# Patient Record
Sex: FEMALE | Race: WHITE
[De-identification: names, ages, dates, MRNs, and addresses within clinical notes are randomized per-mention and may not be internally consistent; named-entity substitution may affect disease eponyms.]

---

## 2021-11-22 ENCOUNTER — HOSPITAL ENCOUNTER (OUTPATIENT)
Dept: HOSPITAL 83 - COVID19 | Age: 71
Discharge: HOME | End: 2021-11-22
Attending: STUDENT IN AN ORGANIZED HEALTH CARE EDUCATION/TRAINING PROGRAM
Payer: COMMERCIAL

## 2021-11-22 DIAGNOSIS — Z11.52: Primary | ICD-10-CM

## 2023-01-05 DIAGNOSIS — M54.12 CERVICAL RADICULOPATHY: Primary | ICD-10-CM

## 2023-01-06 ENCOUNTER — PROCEDURE VISIT (OUTPATIENT)
Dept: PHYSICAL MEDICINE AND REHAB | Age: 73
End: 2023-01-06

## 2023-01-06 VITALS — BODY MASS INDEX: 22.2 KG/M2 | HEIGHT: 64 IN | WEIGHT: 130 LBS

## 2023-01-06 DIAGNOSIS — G56.03 BILATERAL CARPAL TUNNEL SYNDROME: Primary | ICD-10-CM

## 2023-01-06 DIAGNOSIS — M54.12 CERVICAL RADICULOPATHY: ICD-10-CM

## 2023-01-06 NOTE — PROGRESS NOTES
[address] [facility]  Electrodiagnostic Laboratory  *Accredited by the [address] [place] [address] with exemplary status  [address] [facility] [address]  Phone: [phone]  Fax: [phone]      Date of Examination: 01/06/23  Patient Name: Sj Longoria  is a 71y.o. year old female who was seen today regarding   Chief Complaint   Patient presents with    Extremity Pain     Right arm pain and hand pain. Pain described as throbbing and burning. Mostly at night. Pain rated 9/10. Symptoms for 4 weeks. Numbness     Right arm numbness. Extremity Weakness     Slight right hand weakness. .  The symptoms started after no injury. The symptoms are intermittent. Previous workup has included: none. No past medical history on file. Past surgical history consistent with C3-4 posterior fusion. There is not family history of neuromuscular conditions. ROS: There has been no associated vision change, hearing change, speech abnormality, swallowing abnormality, or bowel or bladder dysfunction. Physical Exam: General: The patient is in no apparent distress. Height 5' 4\" (1.626 m), weight 130 lb (59 kg). MSK: There is no joint effusion, deformity, instability, swelling, erythema or warmth. AROM is full in the spine and extremities. +Tinel bilateral wrist. Negative Spurling. Neurologic:  No focal sensorimotor deficit. Reflexes 2+ and symmetric. Gait is normal.    Impression:     1. Bilateral carpal tunnel syndrome        Plan:   EMG is indicated to evaluate the above diagnosis. Orders Placed This Encounter   Procedures    ME NEEDLE EMG EA EXTREMTY W/PARASPINL AREA COMPLETE    ME NERVE CONDUCTION STUDIES 7-8 STUDIES     EMG was done today and showed bilateral median mononeuropathy at the wrists, clinically consistent with carpal tunnel syndrome. The patient was educated about the diagnosis and the prognosis.    Recommend neutral wrist splints at h.s., OT and/or carpal tunnel injection and if no improvement after 4-6 weeks of conservative treatments consider orthopedic surgery evaluation. Recommend repeating the EMG in 1 year if symptoms persist.    Advised patient to follow up with referring provider. Thank you for allowing me to participate in the care of your patient.       Sincerely,     Eugenio Isaac, DO

## 2023-01-06 NOTE — PROGRESS NOTES
[address] [facility]  Electrodiagnostic Laboratory  *Accredited by the [address] [place] [address] with exemplary status  [address] [facility] [address]  Phone: [phone]  Fax: [phone]    Referring Provider: Gregory Stahl MD  Primary Care Physician: No primary care provider on file. Patient Name: Sammy Carrillo  Patient YOB: 1950  Gender: female  BMI: Body mass index is 22.31 kg/m². Height 5' 4\" (1.626 m), weight 130 lb (59 kg). 1/6/2023    Reason for Referral: cervical radic    Description of clinical problem:   Chief Complaint   Patient presents with    Extremity Pain     Right arm pain and hand pain. Pain described as throbbing and burning. Mostly at night. Pain rated 9/10. Symptoms for 4 weeks. Numbness     Right arm numbness. Extremity Weakness     Slight right hand weakness. Sensory NCS      Nerve / Sites Rec. Site Peak Lat PP Amp Segments Distance Velocity Temp. ms µV  cm m/s °C   R Median - Digit II (Antidromic)      Palm Dig II 2.76 15.7 Palm - Dig II 7 37 32      Wrist Dig II 5.63* 15.3 Wrist - Dig II 14 31 32   L Median - Digit II (Antidromic)      Palm Dig II 2.86 26.6 Palm - Dig II 7 35 32      Wrist Dig II 4.90* 25.8 Wrist - Dig II 14 35 32   R Ulnar - Digit V (Antidromic)      Wrist Dig V 4.00 13.4 Wrist - Dig V 14 44 32   R Radial - Anatomical snuff box (Forearm)      Forearm Wrist 2.86 20.2 Forearm - Wrist 10 45 32       Motor NCS      Nerve / Sites Muscle Onset Amplitude Segments Distance Velocity Temp.     ms mV  cm m/s °C   R Median - APB      Palm APB 2.03 11.3 Palm - APB   32      Wrist APB 6.15* 8.2 Wrist - Palm 8 19* 32      Elbow APB 9.48 8.0 Elbow - Wrist 17 51 32   L Median - APB      Palm APB 2.24 9.2 Palm - APB   32      Wrist APB 5.83* 6.1 Wrist - Palm 8 22* 32      Elbow APB 9.22 6.0 Elbow - Wrist 17 50 32   R Ulnar - ADM      Wrist ADM 3.70 12.4 Wrist - ADM 8  32      B. Elbow ADM 6.88 11.8 B. Elbow - Wrist 19 60 32      A. Elbow ADM 8.59 11. 4 A. Elbow - B. Elbow 10 58 32       F Wave      Nerve Fmin % F    ms %   R Median - APB 29.17 90   R Ulnar - ADM 29.32 20   L Median - APB 27.34 50       EMG      EMG Summary Table     Spontaneous MUAP Recruitment   Muscle Nerve Roots IA Fib PSW Fasc Amp Dur. PPP Pattern   R. Biceps brachii Musculocutaneous C5-C6 N None None None N N N N   R. Triceps brachii Radial C6-C8 N None None None N N N N   R. Pronator teres Median C6-C7 N None None None N N N N   R. First dorsal interosseous Ulnar C8-T1 N None None None N N N N   R. Abductor pollicis brevis Median E6-C4 N None None None N N N N   R. Cervical paraspinals (low)  - N None None None N N N N   R. Cervical paraspinals (mid)  - N None None None N N N N   L. Cervical paraspinals (low)  - 1+ None None None N N N N   L. Cervical paraspinals (mid)  - N None None None N N N N   L. Biceps brachii Musculocutaneous C5-C6 N None None None N N N N   L. Triceps brachii Radial C6-C8 N None None None N N N N   L. Pronator teres Median C6-C7 N None None None N N N N   L. First dorsal interosseous Ulnar C8-T1 N None None None N N N N   L. Abductor pollicis brevis Median V3-I4 N None None None N N N N        Study Limitations:  none    Summary of Findings:   Nerve conduction studies: The following nerve conduction studies were abnormal:   Bilateral median sensory latency at the wrist is prolonged. Bilateral median motor latency at the wrist is prolonged and there is focal slowing of conduction velocity across the wrists. All other nerve conduction studies, as listed in the table were normal in latency, amplitude and conduction velocity. Needle EMG:   Needle EMG was performed using a concentric needle. Observed motor units were normal in amplitude, duration, phases and recruitment and no active denervation signs were seen. Diagnostic Interpretation: This study was abnormal.     Electrodiagnosis: There is electrodiagnostic evidence of a median mononeuropathy. Location: bilateral at the wrist.   Adam Linda: [  ] Axonal   [ X ] Demyelinating  [  ] Mixed axonal and demyelinating     [  ] Sensory [  ] Motor               [ X ] Mixed sensorimotor     [  ] with active denervation       [ X ] without active denervation  Duration: Acute  Severity: moderate  Prognosis: Good    Previous Study: There is not a prior study for comparison. Follow up EMG is recommended if no surgical intervention and symptoms persist in one year. Technologist: Kinga Benavides LPN  Physician:     Kraig Russell D.O., P.T. Board Certified Physical Medicine and Rehabilitation  Board Certified Electrodiagnostic Medicine      Nerve conduction studies and electromyography were performed according to our laboratory policies and procedures which can be provided upon request. All abnormal values are identified in the table. Laboratory normal values can also be provided upon request.       Cc: Kimberley Ray MD  No primary care provider on file.

## 2023-01-06 NOTE — PATIENT INSTRUCTIONS
Electrodiagnotic Laboratory  Accredited by the [facility] with Exemplary status  DENISSE Rosa D.O. [facility]  [address] [facility] [address]  Phone: [phone]  Fax: [phone]        Today you had an electrodiagnostic exam which included nerve conduction studies (NCS) and electromyography (EMG). This test evaluated the electrical activity of your nerves and muscles to help determine if you have a nerve or muscle disease. This test can help determine the location and type of a nerve or muscle problem. This will help your referring doctor diagnose your condition and determine the appropriate next step in your treatment plan. After your test:    1. There are no long lasting side effects of the test.     2. You may resume your normal activities without restrictions. 3.  Resume any medications that were stopped for the test.     4  If you have sore areas or bruising in your muscles where the needle was placed, apply a cold pack to the sore area for 15-20 minutes three to four times a day as needed for pain. The soreness should go away in about 1-2 days. 5. Your results were provided  Briefly at the end of your test and the final detailed report will be provided to your referring physician, and/or primary care physician and any other parties you requested within 1-2 days of the examination. You may wish to contact your referring provider after a few days to determine what they would like you to do next. 6.  Please call [phone] with any questions or concerns and if you develop increased body temperature/fever, swelling, tenderness, increased pain and/or drainage from the sites where the needle was placed. Thank you for choosing us for your health care needs.

## 2023-08-02 ENCOUNTER — HOSPITAL ENCOUNTER (OUTPATIENT)
Age: 73
Setting detail: OBSERVATION
Discharge: HOME | End: 2023-08-03
Attending: STUDENT IN AN ORGANIZED HEALTH CARE EDUCATION/TRAINING PROGRAM | Admitting: STUDENT IN AN ORGANIZED HEALTH CARE EDUCATION/TRAINING PROGRAM
Payer: MEDICARE

## 2023-08-02 PROBLEM — I21.4 NSTEMI (NON-ST ELEVATED MYOCARDIAL INFARCTION) (HCC): Status: ACTIVE | Noted: 2023-08-02

## 2023-08-02 PROBLEM — Z95.5 S/P CORONARY ARTERY STENT PLACEMENT: Status: ACTIVE | Noted: 2023-08-02

## 2023-08-02 LAB
ABO + RH BLD: NORMAL
ACTIVATED CLOTTING TIME, LOW RANGE: 274 SEC
ACTIVATED CLOTTING TIME, LOW RANGE: 278 SEC
ARM BAND NUMBER: NORMAL
BLOOD BANK SAMPLE EXPIRATION: NORMAL
BLOOD GROUP ANTIBODIES SERPL: NEGATIVE

## 2023-08-02 PROCEDURE — C1769 GUIDE WIRE: HCPCS

## 2023-08-02 PROCEDURE — 6360000002 HC RX W HCPCS

## 2023-08-02 PROCEDURE — 2709999900 HC NON-CHARGEABLE SUPPLY

## 2023-08-02 PROCEDURE — C1894 INTRO/SHEATH, NON-LASER: HCPCS

## 2023-08-02 PROCEDURE — C1887 CATHETER, GUIDING: HCPCS

## 2023-08-02 PROCEDURE — 2580000003 HC RX 258: Performed by: INTERNAL MEDICINE

## 2023-08-02 PROCEDURE — 86900 BLOOD TYPING SEROLOGIC ABO: CPT

## 2023-08-02 PROCEDURE — 36415 COLL VENOUS BLD VENIPUNCTURE: CPT

## 2023-08-02 PROCEDURE — G0378 HOSPITAL OBSERVATION PER HR: HCPCS

## 2023-08-02 PROCEDURE — C1874 STENT, COATED/COV W/DEL SYS: HCPCS

## 2023-08-02 PROCEDURE — G0379 DIRECT REFER HOSPITAL OBSERV: HCPCS

## 2023-08-02 PROCEDURE — 2720000010 HC SURG SUPPLY STERILE

## 2023-08-02 PROCEDURE — 6370000000 HC RX 637 (ALT 250 FOR IP)

## 2023-08-02 PROCEDURE — 6370000000 HC RX 637 (ALT 250 FOR IP): Performed by: INTERNAL MEDICINE

## 2023-08-02 PROCEDURE — 2500000003 HC RX 250 WO HCPCS

## 2023-08-02 PROCEDURE — 85347 COAGULATION TIME ACTIVATED: CPT

## 2023-08-02 PROCEDURE — 93458 L HRT ARTERY/VENTRICLE ANGIO: CPT | Performed by: INTERNAL MEDICINE

## 2023-08-02 PROCEDURE — 93005 ELECTROCARDIOGRAM TRACING: CPT | Performed by: INTERNAL MEDICINE

## 2023-08-02 PROCEDURE — 93458 L HRT ARTERY/VENTRICLE ANGIO: CPT

## 2023-08-02 PROCEDURE — 92928 PRQ TCAT PLMT NTRAC ST 1 LES: CPT | Performed by: INTERNAL MEDICINE

## 2023-08-02 PROCEDURE — 86901 BLOOD TYPING SEROLOGIC RH(D): CPT

## 2023-08-02 PROCEDURE — 86850 RBC ANTIBODY SCREEN: CPT

## 2023-08-02 PROCEDURE — C1725 CATH, TRANSLUMIN NON-LASER: HCPCS

## 2023-08-02 PROCEDURE — C9600 PERC DRUG-EL COR STENT SING: HCPCS

## 2023-08-02 RX ORDER — ENOXAPARIN SODIUM 100 MG/ML
30 INJECTION SUBCUTANEOUS DAILY
Status: DISCONTINUED | OUTPATIENT
Start: 2023-08-03 | End: 2023-08-02

## 2023-08-02 RX ORDER — SODIUM CHLORIDE 9 MG/ML
INJECTION, SOLUTION INTRAVENOUS PRN
Status: DISCONTINUED | OUTPATIENT
Start: 2023-08-02 | End: 2023-08-03 | Stop reason: HOSPADM

## 2023-08-02 RX ORDER — ATENOLOL 25 MG/1
25 TABLET ORAL DAILY
Status: ON HOLD | COMMUNITY
End: 2023-08-02

## 2023-08-02 RX ORDER — NITROGLYCERIN 0.4 MG/1
0.4 TABLET SUBLINGUAL EVERY 5 MIN PRN
Status: DISCONTINUED | OUTPATIENT
Start: 2023-08-02 | End: 2023-08-03 | Stop reason: HOSPADM

## 2023-08-02 RX ORDER — ONDANSETRON 2 MG/ML
4 INJECTION INTRAMUSCULAR; INTRAVENOUS EVERY 6 HOURS PRN
Status: DISCONTINUED | OUTPATIENT
Start: 2023-08-02 | End: 2023-08-03 | Stop reason: HOSPADM

## 2023-08-02 RX ORDER — SODIUM CHLORIDE 9 MG/ML
INJECTION, SOLUTION INTRAVENOUS CONTINUOUS
Status: DISCONTINUED | OUTPATIENT
Start: 2023-08-02 | End: 2023-08-02

## 2023-08-02 RX ORDER — SODIUM CHLORIDE 9 MG/ML
INJECTION, SOLUTION INTRAVENOUS CONTINUOUS
Status: DISCONTINUED | OUTPATIENT
Start: 2023-08-02 | End: 2023-08-03 | Stop reason: HOSPADM

## 2023-08-02 RX ORDER — ACETAMINOPHEN 325 MG/1
650 TABLET ORAL EVERY 4 HOURS PRN
Status: DISCONTINUED | OUTPATIENT
Start: 2023-08-02 | End: 2023-08-03 | Stop reason: HOSPADM

## 2023-08-02 RX ORDER — SODIUM CHLORIDE 0.9 % (FLUSH) 0.9 %
5-40 SYRINGE (ML) INJECTION EVERY 12 HOURS SCHEDULED
Status: DISCONTINUED | OUTPATIENT
Start: 2023-08-02 | End: 2023-08-03 | Stop reason: HOSPADM

## 2023-08-02 RX ORDER — METOPROLOL SUCCINATE 25 MG/1
25 TABLET, EXTENDED RELEASE ORAL DAILY
Status: DISCONTINUED | OUTPATIENT
Start: 2023-08-03 | End: 2023-08-03 | Stop reason: HOSPADM

## 2023-08-02 RX ORDER — ROSUVASTATIN CALCIUM 10 MG/1
40 TABLET, COATED ORAL NIGHTLY
Status: DISCONTINUED | OUTPATIENT
Start: 2023-08-02 | End: 2023-08-03 | Stop reason: HOSPADM

## 2023-08-02 RX ORDER — ASPIRIN 81 MG/1
81 TABLET, CHEWABLE ORAL DAILY
Status: DISCONTINUED | OUTPATIENT
Start: 2023-08-03 | End: 2023-08-03 | Stop reason: HOSPADM

## 2023-08-02 RX ORDER — ENOXAPARIN SODIUM 100 MG/ML
40 INJECTION SUBCUTANEOUS DAILY
Status: DISCONTINUED | OUTPATIENT
Start: 2023-08-03 | End: 2023-08-03 | Stop reason: HOSPADM

## 2023-08-02 RX ORDER — SODIUM CHLORIDE 0.9 % (FLUSH) 0.9 %
5-40 SYRINGE (ML) INJECTION PRN
Status: DISCONTINUED | OUTPATIENT
Start: 2023-08-02 | End: 2023-08-03 | Stop reason: HOSPADM

## 2023-08-02 RX ADMIN — TICAGRELOR 90 MG: 90 TABLET ORAL at 22:10

## 2023-08-02 RX ADMIN — ROSUVASTATIN 40 MG: 10 TABLET, FILM COATED ORAL at 21:25

## 2023-08-02 RX ADMIN — SODIUM CHLORIDE, PRESERVATIVE FREE 10 ML: 5 INJECTION INTRAVENOUS at 21:26

## 2023-08-02 RX ADMIN — ACETAMINOPHEN 650 MG: 325 TABLET ORAL at 13:53

## 2023-08-02 RX ADMIN — SODIUM CHLORIDE: 9 INJECTION, SOLUTION INTRAVENOUS at 12:35

## 2023-08-02 ASSESSMENT — PAIN - FUNCTIONAL ASSESSMENT: PAIN_FUNCTIONAL_ASSESSMENT: ACTIVITIES ARE NOT PREVENTED

## 2023-08-02 ASSESSMENT — PAIN DESCRIPTION - DESCRIPTORS: DESCRIPTORS: ACHING

## 2023-08-02 ASSESSMENT — PAIN DESCRIPTION - FREQUENCY: FREQUENCY: INTERMITTENT

## 2023-08-02 ASSESSMENT — PAIN SCALES - GENERAL
PAINLEVEL_OUTOF10: 3
PAINLEVEL_OUTOF10: 0

## 2023-08-02 ASSESSMENT — PAIN DESCRIPTION - LOCATION: LOCATION: NECK

## 2023-08-02 NOTE — PROGRESS NOTES
4 Eyes Skin Assessment     NAME:  Brock Harris  YOB: 1950  MEDICAL RECORD NUMBER:  [id number]    The patient is being assessed for  Admission    I agree that at least one RN has performed a thorough Head to Toe Skin Assessment on the patient. ALL assessment sites listed below have been assessed. Areas assessed by both nurses:    Head, Face, Ears, Shoulders, Back, Chest, Arms, Elbows, Hands, Sacrum. Buttock, Coccyx, Ischium, Legs. Feet and Heels, and Under Medical Devices         Does the Patient have a Wound?  No noted wound(s)       Josh Prevention initiated by RN: No  Wound Care Orders initiated by RN: No    Pressure Injury (Stage 3,4, Unstageable, DTI, NWPT, and Complex wounds) if present, place Wound referral order by RN under : No    New Ostomies, if present place, Ostomy referral order under : No     Nurse 1 eSignature: Electronically signed by Carina Purvis RN on 8/2/23 at 4:22 PM EDT    **SHARE this note so that the co-signing nurse can place an eSignature**    Nurse 2 eSignature: Electronically signed by Maite Peck RN on 8/2/23 at 6:43 PM EDT

## 2023-08-02 NOTE — PROCEDURES
[address], [address] [facility]                            CARDIAC CATHETERIZATION    PATIENT NAME: Zelalem Frazier                        :        1950  MED REC NO:   [id number]                            ROOM:       6323  ACCOUNT NO:   [de-identified]                           ADMIT DATE: 2023  PROVIDER:     Nestor Bennett MD    DATE OF PROCEDURE:  2023    PROCEDURE PERFORMED:  Left heart catheterization, selective coronary  angiography, left ventriculography, balloon angioplasty with deployment  of a drug-eluting coronary stent to the mid/distal RCA with dilatation  of the stent post deployment with a larger high-pressure noncompliant  balloon. The procedure was done through right radial approach using ultrasound  guidance. The patient received intravenous Versed and intravenous fentanyl for  sedation. INDICATION:  Non-ST-elevation myocardial infarction. AUC for cardiac catheterization:  8-3. AUC for PCI:  7-16. PRESSURES:  Aorta 93/58 with a mean of 74. Left ventricle systolic pressure 914, left ventricular end-diastolic  pressure 17. There was no significant gradient across the aortic valve. CORONARY ANGIOGRAPHY:  Left main:  The left main artery did not appear to have any significant  angiographic disease. LAD:  The left anterior descending artery had luminal irregularities in  its proximal and middle segment and tapered down to become a small  caliber vessel towards the apex. There was around 20% to 30% proximal  and mid LAD disease. A small-to-moderate size first diagonal branch had  around 70% ostial narrowing. LCX:  The left circumflex is basically a large lateral branch which has  around 30% proximal disease. It also had what appeared to be around 30%  disease in the mid vessel at a bend in the vessel. RCA:  The right coronary artery is a dominant vessel.   It has

## 2023-08-02 NOTE — H&P
History & Physical      PCP: Harshil Jorge MD    Date of Admission: 8/2/2023    Date of Service: Pt seen/examined on 8/2/2023 and is admitted to Inpatient with expected LOS greater than two midnights due to medical therapy. Chief Complaint:  Chest heaviness and SOB on exertion    History Of Present Illness:    Ms. Cookie Easley, a 71y.o. year old female  who  has a past medical history of Hyperlipidemia and Hypertension. She presented to hospital after being advised by her PCP. She has been noticing chest heaviness since 2 weeks when she started walking as a part of her exercise regime. She has been feeling chest heaviness, squeezing sensation that lasts for 5-10 minutes on exertion and relieves with rest. She has not noticed aches and pains. Chest tightness is also associated with SOB and has been gradually worsening. She has never experienced similar episodes before. Yesterday her symptoms got worse and frequent even at rest which got her worried and she called her PCP office who directed her to go to ER, she was at [facility], troponins trended up to 1.57, she was transferred to this facility. Left heart catheterization, selective coronary angiography, left ventriculography, balloon angioplasty with deployment  of a drug-eluting coronary stent to the mid/distal RCA with dilatation of the stent post deployment with a larger high-pressure noncompliant balloon was performed by Dr. Jaxon Shaikh. When I went to patient's room, she was having her lunch, looked comfortable. Past Medical History:        Diagnosis Date    Hyperlipidemia     Hypertension        Past Surgical History:    History reviewed. No pertinent surgical history. Medications Prior to Admission:      Prior to Admission medications    Not on File       Allergies:  Clindamycin/lincomycin    Social History:    TOBACCO:   reports that she has never smoked.  She does not have any smokeless tobacco history on myocardial infarction) [facility])  Resolved Problems:    * No resolved hospital problems. *    PLAN:      - Admit   -S/P drug-eluting coronary stent to the mid/distal RCA  - Start Aspirin, Brillinta , Statin   - Lipid panel and labs reviewed      Diet: ADULT DIET; Regular; Low Fat/Low Chol/High Fiber/2 gm Na  Code Status: Full Code    Surrogate decision maker confirmed with patient:   Extended Emergency Contact Information  Primary Emergency Contact: [facility]  Address: [address] [place] [address]  Mobile Phone: [phone]  Relation: Spouse      DVT Prophylaxis: [x]Lovenox []Heparin []PCD [] [address] []Encouraged ambulation  Disposition: []Med/Surg [x] Intermediate [] ICU/CCU  Admit status: [] Observation [x] Inpatient       [facility] [place], [place]  +++++++++++++++++++++++++++++++++++++++++++++++++  NOTE: This report was transcribed using voice recognition software. Every effort was made to ensure accuracy; however, inadvertent computerized transcription errors may be present.

## 2023-08-02 NOTE — PROGRESS NOTES
Pharmacist Review and Automatic Dose Adjustment of Prophylactic Enoxaparin    Reviewed reason(s) for admission/hospital problem list    The reviewing pharmacist has made an adjustment to the ordered enoxaparin dose or converted to UFH per the approved [address] [facility] [address] protocol and table as identified below. Cherelle Corbin is a 71 y.o. female. Recent Labs     08/02/23  0502   CREATININE 0.8       Estimated Creatinine Clearance: 56 mL/min (based on SCr of 0.8 mg/dL). Recent Labs     08/02/23  0502   HGB 14.0   HCT 41.9        No results for input(s): INR in the last 72 hours. Height:   Ht Readings from Last 1 Encounters:   01/06/23 5' 4\" (1.626 m)     Weight:  Wt Readings from Last 1 Encounters:   08/02/23 132 lb (59.9 kg)               Plan: Based upon the patient's weight and renal function    Ordered: Enoxaparin 30mg SUBQ Daily    Changed/converted to    New Order: Enoxaparin 40mg SUBQ Daily    Verified okay to adjust per Dr. Shannan Dowling. Please call pharmacy with any questions.     Thank you,  Emir Castaneda, [facility]  8/2/2023, 12:16 PM

## 2023-08-03 VITALS
TEMPERATURE: 97.5 F | WEIGHT: 132 LBS | BODY MASS INDEX: 22.66 KG/M2 | OXYGEN SATURATION: 96 % | HEART RATE: 60 BPM | SYSTOLIC BLOOD PRESSURE: 117 MMHG | RESPIRATION RATE: 15 BRPM | DIASTOLIC BLOOD PRESSURE: 72 MMHG

## 2023-08-03 LAB
ALBUMIN SERPL-MCNC: 3.7 G/DL (ref 3.5–5.2)
ALP SERPL-CCNC: 52 U/L (ref 35–104)
ALT SERPL-CCNC: 16 U/L (ref 0–32)
ANION GAP SERPL CALCULATED.3IONS-SCNC: 12 MMOL/L (ref 7–16)
AST SERPL-CCNC: 32 U/L (ref 0–31)
BILIRUB SERPL-MCNC: 0.7 MG/DL (ref 0–1.2)
BUN SERPL-MCNC: 13 MG/DL (ref 6–23)
CALCIUM SERPL-MCNC: 8.2 MG/DL (ref 8.6–10.2)
CHLORIDE SERPL-SCNC: 110 MMOL/L (ref 98–107)
CO2 SERPL-SCNC: 19 MMOL/L (ref 22–29)
CREAT SERPL-MCNC: 0.7 MG/DL (ref 0.5–1)
EKG ATRIAL RATE: 56 BPM
EKG P AXIS: 52 DEGREES
EKG P-R INTERVAL: 220 MS
EKG Q-T INTERVAL: 426 MS
EKG QRS DURATION: 66 MS
EKG QTC CALCULATION (BAZETT): 411 MS
EKG R AXIS: -6 DEGREES
EKG T AXIS: 37 DEGREES
EKG VENTRICULAR RATE: 56 BPM
GFR SERPL CREATININE-BSD FRML MDRD: >60 ML/MIN/1.73M2
GLUCOSE SERPL-MCNC: 106 MG/DL (ref 74–99)
MAGNESIUM SERPL-MCNC: 2.1 MG/DL (ref 1.6–2.6)
POTASSIUM SERPL-SCNC: 3.4 MMOL/L (ref 3.5–5)
PROT SERPL-MCNC: 5.7 G/DL (ref 6.4–8.3)
SODIUM SERPL-SCNC: 141 MMOL/L (ref 132–146)

## 2023-08-03 PROCEDURE — 6370000000 HC RX 637 (ALT 250 FOR IP): Performed by: STUDENT IN AN ORGANIZED HEALTH CARE EDUCATION/TRAINING PROGRAM

## 2023-08-03 PROCEDURE — 2580000003 HC RX 258: Performed by: INTERNAL MEDICINE

## 2023-08-03 PROCEDURE — 93010 ELECTROCARDIOGRAM REPORT: CPT | Performed by: INTERNAL MEDICINE

## 2023-08-03 PROCEDURE — 6370000000 HC RX 637 (ALT 250 FOR IP): Performed by: INTERNAL MEDICINE

## 2023-08-03 PROCEDURE — 36415 COLL VENOUS BLD VENIPUNCTURE: CPT

## 2023-08-03 PROCEDURE — 80053 COMPREHEN METABOLIC PANEL: CPT

## 2023-08-03 PROCEDURE — G0378 HOSPITAL OBSERVATION PER HR: HCPCS

## 2023-08-03 PROCEDURE — 83735 ASSAY OF MAGNESIUM: CPT

## 2023-08-03 RX ORDER — ROSUVASTATIN CALCIUM 40 MG/1
40 TABLET, COATED ORAL NIGHTLY
Qty: 30 TABLET | Refills: 3 | Status: SHIPPED | OUTPATIENT
Start: 2023-08-03

## 2023-08-03 RX ORDER — METOPROLOL SUCCINATE 25 MG/1
25 TABLET, EXTENDED RELEASE ORAL DAILY
Qty: 30 TABLET | Refills: 3 | Status: SHIPPED | OUTPATIENT
Start: 2023-08-04

## 2023-08-03 RX ORDER — ASPIRIN 81 MG/1
81 TABLET, CHEWABLE ORAL DAILY
Qty: 30 TABLET | Refills: 3 | Status: SHIPPED | OUTPATIENT
Start: 2023-08-04

## 2023-08-03 RX ADMIN — POTASSIUM BICARBONATE 40 MEQ: 782 TABLET, EFFERVESCENT ORAL at 09:30

## 2023-08-03 RX ADMIN — TICAGRELOR 90 MG: 90 TABLET ORAL at 09:30

## 2023-08-03 RX ADMIN — SODIUM CHLORIDE, PRESERVATIVE FREE 10 ML: 5 INJECTION INTRAVENOUS at 09:34

## 2023-08-03 RX ADMIN — ASPIRIN 81 MG 81 MG: 81 TABLET ORAL at 09:30

## 2023-08-03 RX ADMIN — METOPROLOL SUCCINATE 25 MG: 25 TABLET, EXTENDED RELEASE ORAL at 09:30

## 2023-08-03 RX ADMIN — ACETAMINOPHEN 650 MG: 325 TABLET ORAL at 09:31

## 2023-08-03 ASSESSMENT — PAIN SCALES - GENERAL
PAINLEVEL_OUTOF10: 2
PAINLEVEL_OUTOF10: 0
PAINLEVEL_OUTOF10: 1
PAINLEVEL_OUTOF10: 0

## 2023-08-03 ASSESSMENT — PAIN DESCRIPTION - LOCATION: LOCATION: BACK

## 2023-08-03 ASSESSMENT — PAIN DESCRIPTION - DESCRIPTORS: DESCRIPTORS: ACHING;DISCOMFORT;DULL

## 2023-08-03 ASSESSMENT — PAIN DESCRIPTION - ORIENTATION: ORIENTATION: LEFT;LOWER

## 2023-08-03 NOTE — CONSULTS
Met with patient and discussed that their physician has ordered a referral to our outpatient Phase II Cardiac Rehabilitation program. Reviewed the benefits of cardiac rehabilitation based on their diagnosis and personal risk factors. Patient demonstrates mild interest in Cardiac Rehabilitation at this time. Cardiac Rehabilitation brochure provided to patient/family. The Cardiac Rehabilitation Program has been provided the patient's referral information and pertinent patient details and history. The patient may call [employer]  at [phone] for additional information or questions. Contact information for [address] [place] [address] and other choices close to the patient's residence have been provided in the discharge instructions so that the patient may call and schedule an appointment when cleared by their physician.  Thank you for the referral.

## 2023-08-03 NOTE — PLAN OF CARE
Problem: Discharge Planning  Goal: Discharge to home or other facility with appropriate resources  8/3/2023 1040 by Latia Meadows RN  Outcome: Progressing  Flowsheets (Taken 8/3/2023 0151)  Discharge to home or other facility with appropriate resources: Identify barriers to discharge with patient and caregiver  8/3/2023 0045 by Irma Ayala RN  Outcome: Progressing     Problem: Pain  Goal: Verbalizes/displays adequate comfort level or baseline comfort level  Outcome: Progressing  Flowsheets (Taken 8/3/2023 0748)  Verbalizes/displays adequate comfort level or baseline comfort level: Assess pain using appropriate pain scale

## 2023-08-03 NOTE — PROGRESS NOTES
CLINICAL PHARMACY NOTE: MEDS TO BEDS    Total # of Prescriptions Filled: 1   The following medications were delivered to the patient:  Brilinta 90mg    Additional Documentation:  Patient's  picked up in pharmacy and refills/ other meds were sent to [facility] in [facility] 8-3-23

## 2023-08-03 NOTE — CARE COORDINATION
08/03/23 Transition of Care: patient remains observation post heart cath with intervention. She is pending cardiology rounding for discharge order today. She resides with her  and feels she will have no home going needs.  Electronically signed by Marquita Swanson RN CM on 8/3/2023 at 2:28 PM

## 2023-08-03 NOTE — PROGRESS NOTES
274       Labs:   Recent Labs     08/02/23  0502 08/03/23  0620    141   K 3.9 3.4*   * 110*   CO2 24 19*   BUN 19 13   CREATININE 0.8 0.7       Labs:   Recent Labs     08/01/23  1438 08/01/23  2102 08/02/23  0502   TROPONINI 0.04* 0.57* 1.58*       Labs: No results for input(s): BNP in the last 72 hours. Labs:   Recent Labs     08/02/23  0502   CHOL 263*   HDL 42   TRIG 204*       Labs:   Recent Labs     08/02/23  0502 08/03/23  0620   PROT 5.8* 5.7*       Review of systems: No reported significant weight gain or weight loss. no dysuria or frequency, no dizziness, falls or trauma, no change in bowel or bladder habits, no hematochezia, hemoptysis or hematuria. No fevers, chills, nausea or vomiting reported. No significant wheezing or sputum production. No headache or visual changes. The remainder of the 10 review of systems otherwise negative. Exam      General: Patient comfortable in no distress and currently denies any chest pain. HEENT: Face symmetrical and no apparent cranial nerve deficit. Neck: No jugular venous distention, carotid bruit or thyromegaly. Lungs: Clear bilaterally without rales, wheezes or dullness. Cardiac: Regular rate and rhythm, no S3, S4, no rub or gallop. Abdomen: No rebound or guarding, no hepatosplenomegaly. Extremities: Without significant clubbing , cyanosis, or edema. Neuro:  No focal motor or sensory deficit apparent. Skin: No petechiae, no significant bruising. Assessment: See plan below        Plan: #1 NSTEMI and drug-eluting stent to the RCA. Guideline directed medical therapy aspirin 81 mg daily, Brilinta 90 mg twice a day, rosuvastatin 40 mg daily metoprolol succinate 25 mg daily. Right wrist catheterization site intact. No significant bruising or hematoma. #2 hyperlipidemia continue statin. Continue to modify cardiovascular risk factors.     #3 CAD risk factor modification education reviewed and provided with her and asked her to follow low-sodium low-fat low carbohydrate diet. #4 dual antiplatelet therapy aspirin and Brilinta. Does have some shortness of breath with the Brilinta possibly or with recent infarct. Suspect this will improve. Home today from cardiology viewpoint to be seen back [facility] cardiology outpatient in 1 month.         Electronically signed by Cecy Portillo MD on 8/3/2023 at 2:46 PM

## 2023-08-03 NOTE — PROGRESS NOTES
Discharge instructions and post cath radial instructions reviewed with patient. Patient verbalized understanding. IV's removed. Heart monitor cleaned and returned to nurses station.

## 2023-08-03 NOTE — PROGRESS NOTES
Patient states  she was on atenolol until she was switched to another medication at [facility] and she was also started on Lipitor at [facility]. She is wondering why Crestor is being used here. She did state that she has used the statins in the past and never tolerated them well. Patient also was educated on Toprol XL which the patient has been prescribed here.   Patient was encouraged to discuss these medications with her cardiologist. Edita Chou RN  8/2/2023

## 2023-08-03 NOTE — DISCHARGE INSTRUCTIONS
sure to make and go to all appointments, and call your doctor if you are having problems. It's also a good idea to know your test results and keep a list of the medicines you take. Please call to schedule your first appointment once you have been cleared by your cardiologist to attend Phase II Outpatient Cardiac Rehabilitation. Cardiac Rehabilitation Options:  [facility]  [address] [facility]. Cardiology Services  [facility], [facility] [address] [phone]                                                                                         [facility]  Hours: M/W/F 7am-7pm & T/Th 7am-12pm                                                  [phone]                                                                                                                          [phone]  Cardiac Rehab  [address]                                                                                          [address] [place] [address]  P- [phone]                                                                                         Cardiac Rehabilitation  Hours: M/W/F 7am-6pm                                                                                [address], [address] [facility]                                                          [phone]  Cardiac Rehabilitation

## 2023-08-03 NOTE — DISCHARGE SUMMARY
tablet, Refills: 3      rosuvastatin (CRESTOR) 40 MG tablet Take 1 tablet by mouth nightly  Qty: 30 tablet, Refills: 3      metoprolol succinate (TOPROL XL) 25 MG extended release tablet Take 1 tablet by mouth daily  Qty: 30 tablet, Refills: 3      ticagrelor (BRILINTA) 90 MG TABS tablet Take 1 tablet by mouth 2 times daily  Qty: 60 tablet, Refills: 3             Time Spent on discharge is more than 30 minutes in the examination, evaluation, counseling and review of medications and discharge plan.       Signed:    Symone Charles MD   8/3/2023

## 2023-09-05 ENCOUNTER — OFFICE VISIT (OUTPATIENT)
Dept: CARDIOLOGY CLINIC | Age: 73
End: 2023-09-05
Payer: MEDICARE

## 2023-09-05 VITALS
HEART RATE: 63 BPM | HEIGHT: 64 IN | DIASTOLIC BLOOD PRESSURE: 83 MMHG | SYSTOLIC BLOOD PRESSURE: 133 MMHG | WEIGHT: 123.8 LBS | RESPIRATION RATE: 16 BRPM | BODY MASS INDEX: 21.13 KG/M2

## 2023-09-05 DIAGNOSIS — I25.10 CORONARY ARTERY DISEASE INVOLVING NATIVE CORONARY ARTERY OF NATIVE HEART WITHOUT ANGINA PECTORIS: ICD-10-CM

## 2023-09-05 DIAGNOSIS — E78.5 HYPERLIPIDEMIA, UNSPECIFIED HYPERLIPIDEMIA TYPE: Primary | ICD-10-CM

## 2023-09-05 PROBLEM — I21.4 NSTEMI (NON-ST ELEVATED MYOCARDIAL INFARCTION) (HCC): Status: RESOLVED | Noted: 2023-08-02 | Resolved: 2023-09-05

## 2023-09-05 PROBLEM — Z95.5 S/P CORONARY ARTERY STENT PLACEMENT: Status: RESOLVED | Noted: 2023-08-02 | Resolved: 2023-09-05

## 2023-09-05 PROCEDURE — 99205 OFFICE O/P NEW HI 60 MIN: CPT | Performed by: INTERNAL MEDICINE

## 2023-09-05 PROCEDURE — G8427 DOCREV CUR MEDS BY ELIG CLIN: HCPCS | Performed by: INTERNAL MEDICINE

## 2023-09-05 PROCEDURE — 1036F TOBACCO NON-USER: CPT | Performed by: INTERNAL MEDICINE

## 2023-09-05 PROCEDURE — G8420 CALC BMI NORM PARAMETERS: HCPCS | Performed by: INTERNAL MEDICINE

## 2023-09-05 PROCEDURE — G8400 PT W/DXA NO RESULTS DOC: HCPCS | Performed by: INTERNAL MEDICINE

## 2023-09-05 PROCEDURE — 93000 ELECTROCARDIOGRAM COMPLETE: CPT | Performed by: INTERNAL MEDICINE

## 2023-09-05 PROCEDURE — 3017F COLORECTAL CA SCREEN DOC REV: CPT | Performed by: INTERNAL MEDICINE

## 2023-09-05 PROCEDURE — 1123F ACP DISCUSS/DSCN MKR DOCD: CPT | Performed by: INTERNAL MEDICINE

## 2023-09-05 PROCEDURE — 1090F PRES/ABSN URINE INCON ASSESS: CPT | Performed by: INTERNAL MEDICINE

## 2023-09-05 RX ORDER — CLOPIDOGREL BISULFATE 75 MG/1
75 TABLET ORAL DAILY
Qty: 90 TABLET | Refills: 3 | Status: SHIPPED | OUTPATIENT
Start: 2023-09-05

## 2023-09-05 RX ORDER — VITAMIN B COMPLEX
TABLET ORAL
COMMUNITY

## 2023-09-05 NOTE — PROGRESS NOTES
Chief Complaint   Patient presents with    Coronary Artery Disease       Patient Active Problem List    Diagnosis Date Noted    Coronary artery disease involving native coronary artery of native heart without angina pectoris 09/05/2023     Overview Note:     A. NSTEMI 8/2/2023: PCI to RCA with 2.5 X 26 TANYA, normal EF        HLD (hyperlipidemia) 09/05/2023       Current Outpatient Medications   Medication Sig Dispense Refill    Coenzyme Q10 (COQ10) 100 MG CAPS Take by mouth      aspirin 81 MG chewable tablet Take 1 tablet by mouth daily 30 tablet 3    rosuvastatin (CRESTOR) 40 MG tablet Take 1 tablet by mouth nightly 30 tablet 3    metoprolol succinate (TOPROL XL) 25 MG extended release tablet Take 1 tablet by mouth daily 30 tablet 3    ticagrelor (BRILINTA) 90 MG TABS tablet Take 1 tablet by mouth 2 times daily 60 tablet 3    clopidogrel (PLAVIX) 75 MG tablet Take 1 tablet by mouth daily (Patient not taking: Reported on 9/5/2023) 90 tablet 3     No current facility-administered medications for this visit. Allergies   Allergen Reactions    Clindamycin/Lincomycin Hives       Vitals:    09/05/23 1136   BP: 133/83   Pulse: 63   Resp: 16   Weight: 123 lb 12.8 oz (56.2 kg)   Height: 5' 4\" (1.626 m)                 SUBJECTIVE: Clotilde Tang presents to the office today for consult - dr Annabelle Mcginnis. Seen by dr Tressa Goodpasture for NSTEMI, needed hfu and no phone calls returned. I reviewed the hospital records, and  cath and echo images  Had limited NSTEMI - symptoms were exertinoal chest tightness, jaw pain and bilateral wrist discomfort  Had PCI to RCA with good angiographic result to my review  She denies recurrence of pre-ACS symptoms, but does have intermittent SOB, chest twinges, and muscle aches, as well as mild lightheadedness when bends over  Not participating in cardiac rehab by her own choice.   Is walking a mile a day          Physical Exam   /83   Pulse 63   Resp 16   Ht 5' 4\" (1.626 m)   Wt 123 lb 12.8

## 2024-03-05 ENCOUNTER — OFFICE VISIT (OUTPATIENT)
Dept: CARDIOLOGY CLINIC | Age: 74
End: 2024-03-05
Payer: MEDICARE

## 2024-03-05 VITALS
HEART RATE: 74 BPM | HEIGHT: 64 IN | BODY MASS INDEX: 22.67 KG/M2 | RESPIRATION RATE: 18 BRPM | SYSTOLIC BLOOD PRESSURE: 137 MMHG | DIASTOLIC BLOOD PRESSURE: 77 MMHG | WEIGHT: 132.8 LBS

## 2024-03-05 DIAGNOSIS — I25.10 CORONARY ARTERY DISEASE INVOLVING NATIVE CORONARY ARTERY OF NATIVE HEART WITHOUT ANGINA PECTORIS: Primary | ICD-10-CM

## 2024-03-05 PROCEDURE — G8427 DOCREV CUR MEDS BY ELIG CLIN: HCPCS | Performed by: INTERNAL MEDICINE

## 2024-03-05 PROCEDURE — G8400 PT W/DXA NO RESULTS DOC: HCPCS | Performed by: INTERNAL MEDICINE

## 2024-03-05 PROCEDURE — 1036F TOBACCO NON-USER: CPT | Performed by: INTERNAL MEDICINE

## 2024-03-05 PROCEDURE — G8484 FLU IMMUNIZE NO ADMIN: HCPCS | Performed by: INTERNAL MEDICINE

## 2024-03-05 PROCEDURE — 93000 ELECTROCARDIOGRAM COMPLETE: CPT | Performed by: INTERNAL MEDICINE

## 2024-03-05 PROCEDURE — 1123F ACP DISCUSS/DSCN MKR DOCD: CPT | Performed by: INTERNAL MEDICINE

## 2024-03-05 PROCEDURE — 99214 OFFICE O/P EST MOD 30 MIN: CPT | Performed by: INTERNAL MEDICINE

## 2024-03-05 PROCEDURE — 3017F COLORECTAL CA SCREEN DOC REV: CPT | Performed by: INTERNAL MEDICINE

## 2024-03-05 PROCEDURE — 1090F PRES/ABSN URINE INCON ASSESS: CPT | Performed by: INTERNAL MEDICINE

## 2024-03-05 PROCEDURE — G8420 CALC BMI NORM PARAMETERS: HCPCS | Performed by: INTERNAL MEDICINE

## 2024-03-05 RX ORDER — FUROSEMIDE 20 MG/1
20 TABLET ORAL DAILY PRN
COMMUNITY

## 2024-03-05 NOTE — PROGRESS NOTES
Chief Complaint   Patient presents with    Coronary Artery Disease       Patient Active Problem List    Diagnosis Date Noted    Coronary artery disease involving native coronary artery of native heart without angina pectoris 09/05/2023     Overview Note:     A. NSTEMI 8/2/2023: PCI to RCA with 2.5 X 26 TANYA, normal EF      HLD (hyperlipidemia) 09/05/2023       Current Outpatient Medications   Medication Sig Dispense Refill    furosemide (LASIX) 20 MG tablet Take 1 tablet by mouth daily as needed (prn)      Coenzyme Q10 (COQ10) 100 MG CAPS Take by mouth      clopidogrel (PLAVIX) 75 MG tablet Take 1 tablet by mouth daily 90 tablet 3    aspirin 81 MG chewable tablet Take 1 tablet by mouth daily 30 tablet 3    rosuvastatin (CRESTOR) 40 MG tablet Take 1 tablet by mouth nightly 30 tablet 3    metoprolol succinate (TOPROL XL) 25 MG extended release tablet Take 1 tablet by mouth daily 30 tablet 3     No current facility-administered medications for this visit.        Allergies   Allergen Reactions    Clindamycin/Lincomycin Hives       Vitals:    03/05/24 0912 03/05/24 0917   BP: (!) 162/81 137/77   Site: Left Upper Arm Left Upper Arm   Position: Supine Supine   Pulse: 74 74   Resp: 18    Weight: 60.2 kg (132 lb 12.8 oz)    Height: 1.626 m (5' 4\")                  SUBJECTIVE: Coreen Aleman presents to the office today for f/u    Had limited NSTEMI  8/2023 - - symptoms were exertinoal chest tightness, jaw pain and bilateral wrist discomfort  Had PCI to RCA with good angiographic result to my review    She denies recurrence of pre-ACS symptoms, and her  intermittent SOB went away with change to plavix, and muscle aches less with drop in rosuvastatin dose.   Also says she thinks her heart goes a little fast at night  Has not had her lipids sampled  .  Is walking a mile a day          Physical Exam   /77 (Site: Left Upper Arm, Position: Supine)   Pulse 74   Resp 18   Ht 1.626 m (5' 4\")   Wt 60.2 kg (132 lb 12.8 oz)

## 2024-03-07 ENCOUNTER — TELEPHONE (OUTPATIENT)
Dept: CARDIOLOGY CLINIC | Age: 74
End: 2024-03-07

## 2024-03-07 DIAGNOSIS — E78.5 HYPERLIPIDEMIA, UNSPECIFIED HYPERLIPIDEMIA TYPE: Primary | ICD-10-CM

## 2024-03-07 DIAGNOSIS — I25.10 CORONARY ARTERY DISEASE INVOLVING NATIVE CORONARY ARTERY OF NATIVE HEART WITHOUT ANGINA PECTORIS: ICD-10-CM

## 2024-03-07 LAB
ALBUMIN SERPL-MCNC: 4.2 G/DL (ref 3.5–5.2)
ALP BLD-CCNC: 72 U/L (ref 35–104)
ALT SERPL-CCNC: 22 U/L (ref 0–32)
ANION GAP SERPL CALCULATED.3IONS-SCNC: 12 MMOL/L (ref 7–16)
AST SERPL-CCNC: 21 U/L (ref 0–31)
BILIRUB SERPL-MCNC: 0.5 MG/DL (ref 0–1.2)
BUN BLDV-MCNC: 21 MG/DL (ref 6–23)
CALCIUM SERPL-MCNC: 9.3 MG/DL (ref 8.6–10.2)
CHLORIDE BLD-SCNC: 105 MMOL/L (ref 98–107)
CHOLESTEROL: 175 MG/DL
CO2: 25 MMOL/L (ref 22–29)
CREAT SERPL-MCNC: 0.9 MG/DL (ref 0.5–1)
GFR SERPL CREATININE-BSD FRML MDRD: >60 ML/MIN/1.73M2
GLUCOSE BLD-MCNC: 101 MG/DL (ref 74–99)
HDLC SERPL-MCNC: 53 MG/DL
LDL CHOLESTEROL: 93 MG/DL
POTASSIUM SERPL-SCNC: 4.9 MMOL/L (ref 3.5–5)
SODIUM BLD-SCNC: 142 MMOL/L (ref 132–146)
TOTAL PROTEIN: 6.4 G/DL (ref 6.4–8.3)
TRIGL SERPL-MCNC: 143 MG/DL
VLDLC SERPL CALC-MCNC: 29 MG/DL

## 2024-03-07 RX ORDER — ATORVASTATIN CALCIUM 80 MG/1
80 TABLET, FILM COATED ORAL DAILY
Qty: 90 TABLET | Refills: 3 | Status: SHIPPED | OUTPATIENT
Start: 2024-03-07

## 2024-03-07 NOTE — TELEPHONE ENCOUNTER
----- Message from Rusty Negron MD sent at 3/7/2024  3:54 PM EST -----  Cholesterol still too high  Let's try atorvastatin 80 mg qd rather than continue with rosuvastatin   Repeat lipids 8 weeks at [facility] lab    ----- Message -----  From: [id number] - [facility] Results From [place]  Sent: 3/7/2024   1:51 PM EST  To: Rutsy Negron MD

## 2024-03-07 NOTE — TELEPHONE ENCOUNTER
Patient notifed on results and states understanding. Chart reflects medication change and lipid panel order.

## 2024-05-02 DIAGNOSIS — E78.5 HYPERLIPIDEMIA, UNSPECIFIED HYPERLIPIDEMIA TYPE: ICD-10-CM

## 2024-05-02 LAB
CHOLESTEROL, TOTAL: 151 MG/DL
HDLC SERPL-MCNC: 48 MG/DL
LDL CHOLESTEROL: 83 MG/DL
TRIGL SERPL-MCNC: 100 MG/DL
VLDLC SERPL CALC-MCNC: 20 MG/DL

## 2024-05-03 ENCOUNTER — TELEPHONE (OUTPATIENT)
Dept: CARDIOLOGY CLINIC | Age: 74
End: 2024-05-03

## 2024-05-03 DIAGNOSIS — E78.5 HYPERLIPIDEMIA, UNSPECIFIED HYPERLIPIDEMIA TYPE: Primary | ICD-10-CM

## 2024-05-03 NOTE — TELEPHONE ENCOUNTER
Discussed with patient on her labs and recommendations.      She has been taking 40 mg of the Crestor for about the last month but she is still having muscle aching with this. She stated she feels,karley she has the flu from arm and leg aching.. Please advise if she should decrease this dosage

## 2024-05-03 NOTE — TELEPHONE ENCOUNTER
----- Message from Rusty Negron MD sent at 5/2/2024  6:21 PM EDT -----  Lipids much better but still up  Please document the dose of her crestor, and add zetia 10 mg qd  Lipid panel 8 weeks with us    ----- Message -----  From: [id number] - [facility] Results From [place]  Sent: 5/2/2024   5:49 PM EDT  To: Rusty Negron MD

## 2024-05-06 RX ORDER — EZETIMIBE 10 MG/1
10 TABLET ORAL DAILY
Qty: 90 TABLET | Refills: 1 | Status: SHIPPED | OUTPATIENT
Start: 2024-05-06

## 2024-05-07 RX ORDER — ROSUVASTATIN CALCIUM 20 MG/1
20 TABLET, COATED ORAL DAILY
Qty: 90 TABLET | Refills: 1 | Status: SHIPPED | OUTPATIENT
Start: 2024-05-07

## 2024-07-02 ENCOUNTER — TELEPHONE (OUTPATIENT)
Dept: CARDIOLOGY CLINIC | Age: 74
End: 2024-07-02

## 2024-07-02 DIAGNOSIS — E78.5 HYPERLIPIDEMIA, UNSPECIFIED HYPERLIPIDEMIA TYPE: ICD-10-CM

## 2024-07-02 LAB
CHOLESTEROL, TOTAL: 134 MG/DL
HDLC SERPL-MCNC: 54 MG/DL
LDL CHOLESTEROL: 58 MG/DL
TRIGL SERPL-MCNC: 111 MG/DL
VLDLC SERPL CALC-MCNC: 22 MG/DL

## 2024-07-02 NOTE — TELEPHONE ENCOUNTER
Patient notified and instructed on lab results and recommendations.      Patient has been having a lot of muscle and joint pain with this medications and she feels terrible.

## 2024-07-02 NOTE — TELEPHONE ENCOUNTER
----- Message from Rusty Negron MD sent at 7/2/2024  2:22 PM EDT -----  Labs great on present cholesterol med    ----- Message -----  From: [id number] [facility] Results From [place]  Sent: 7/2/2024   2:21 PM EDT  To: Rusty Negron MD

## 2024-08-26 RX ORDER — CLOPIDOGREL BISULFATE 75 MG/1
75 TABLET ORAL DAILY
Qty: 14 TABLET | Refills: 0 | Status: SHIPPED | OUTPATIENT
Start: 2024-08-26

## 2024-09-05 ENCOUNTER — OFFICE VISIT (OUTPATIENT)
Dept: CARDIOLOGY CLINIC | Age: 74
End: 2024-09-05

## 2024-09-05 VITALS
DIASTOLIC BLOOD PRESSURE: 82 MMHG | RESPIRATION RATE: 16 BRPM | WEIGHT: 129.4 LBS | SYSTOLIC BLOOD PRESSURE: 148 MMHG | BODY MASS INDEX: 22.09 KG/M2 | HEART RATE: 64 BPM | HEIGHT: 64 IN

## 2024-09-05 DIAGNOSIS — I25.10 CORONARY ARTERY DISEASE INVOLVING NATIVE CORONARY ARTERY OF NATIVE HEART WITHOUT ANGINA PECTORIS: Primary | ICD-10-CM

## 2024-09-05 NOTE — PROGRESS NOTES
22.21 kg/m²   Constitutional: Oriented to person, place, and time. Well-developed and well-nourished. No distress.    Neck: No JVD present. Carotid bruit is not present.   Cardiovascular: Normal rate, regular rhythm, normal heart sounds and intact distal pulses.  No gallop and no friction rub.  No murmur heard.  Pulmonary/Chest: Effort normal and breath sounds normal.   Abdominal: Soft. Bowel sounds are normal. No distension and no mass.   Musculoskeletal: No edema   Neurological: Alert and oriented to person, place, and time.   Skin: Skin is warm and dry.   Psychiatric: Normal mood and affect. Behavior is normal.     EKG:  normal EKG, normal sinus rhythm.    ASSESSMENT AND PLAN:  Patient Active Problem List   Diagnosis    Coronary artery disease involving native coronary artery of native heart without angina pectoris    HLD (hyperlipidemia)     Limited NSTEMI 8/2023 - RCA culprit, single TANYA good result, no other obstructive disease, normal LV  No recurrent ischemic symptoms, EKG no change.    Ok to stop plavis     Normal ekg and CV exam  Arthralgias - rosuvastin,  loose, frequent stools - zetia  She will drop rosuvastatin to 5 days a week, and take a week off from zetia as a trial. If BMs go back to normal, resume zetia on qod basis  She is reluctant to use Repatha, which I can understand        Return visit in 1 year    Rusty Negron M.D  [facility]

## 2024-09-05 NOTE — PATIENT INSTRUCTIONS
1.. take the rosuvastatin 5 days a week rather than 7  2.  Take a week off from zetia, see how your stools repond. If goes to normal, resume zetia only every other day  3.  Stop the plavix when you run out

## 2025-08-27 ENCOUNTER — HOSPITAL ENCOUNTER (EMERGENCY)
Age: 75
Discharge: ANOTHER HOSPITAL | End: 2025-08-27
Attending: STUDENT IN AN ORGANIZED HEALTH CARE EDUCATION/TRAINING PROGRAM
Payer: MEDICARE

## 2025-08-27 ENCOUNTER — APPOINTMENT (OUTPATIENT)
Dept: GENERAL RADIOLOGY | Age: 75
End: 2025-08-27
Payer: MEDICARE

## 2025-08-27 VITALS
SYSTOLIC BLOOD PRESSURE: 109 MMHG | TEMPERATURE: 97.4 F | RESPIRATION RATE: 14 BRPM | BODY MASS INDEX: 22.31 KG/M2 | DIASTOLIC BLOOD PRESSURE: 71 MMHG | HEART RATE: 60 BPM | OXYGEN SATURATION: 100 % | WEIGHT: 130 LBS

## 2025-08-27 DIAGNOSIS — I21.3 ST ELEVATION MYOCARDIAL INFARCTION (STEMI), UNSPECIFIED ARTERY (HCC): Primary | ICD-10-CM

## 2025-08-27 LAB
ALBUMIN SERPL-MCNC: 4 G/DL (ref 3.5–5.2)
ALP SERPL-CCNC: 75 U/L (ref 35–104)
ALT SERPL-CCNC: 34 U/L (ref 0–35)
ANION GAP SERPL CALCULATED.3IONS-SCNC: 17 MMOL/L (ref 7–16)
AST SERPL-CCNC: 27 U/L (ref 0–35)
BASOPHILS # BLD: 0.04 K/UL (ref 0–0.2)
BASOPHILS NFR BLD: 0 % (ref 0–2)
BILIRUB SERPL-MCNC: 0.5 MG/DL (ref 0–1.2)
BUN SERPL-MCNC: 23 MG/DL (ref 8–23)
CALCIUM SERPL-MCNC: 9.3 MG/DL (ref 8.8–10.2)
CHLORIDE SERPL-SCNC: 102 MMOL/L (ref 98–107)
CO2 SERPL-SCNC: 20 MMOL/L (ref 22–29)
CREAT SERPL-MCNC: 0.8 MG/DL (ref 0.5–1)
EOSINOPHIL # BLD: 0.13 K/UL (ref 0.05–0.5)
EOSINOPHILS RELATIVE PERCENT: 1 % (ref 0–6)
ERYTHROCYTE [DISTWIDTH] IN BLOOD BY AUTOMATED COUNT: 12.6 % (ref 11.5–15)
GFR, ESTIMATED: 80 ML/MIN/1.73M2
GLUCOSE SERPL-MCNC: 138 MG/DL (ref 74–99)
HCT VFR BLD AUTO: 41.7 % (ref 34–48)
HGB BLD-MCNC: 14.2 G/DL (ref 11.5–15.5)
IMM GRANULOCYTES # BLD AUTO: 0.05 K/UL (ref 0–0.58)
IMM GRANULOCYTES NFR BLD: 0 % (ref 0–5)
INR PPP: 1.2
LYMPHOCYTES NFR BLD: 4.57 K/UL (ref 1.5–4)
LYMPHOCYTES RELATIVE PERCENT: 39 % (ref 20–42)
MAGNESIUM SERPL-MCNC: 2.2 MG/DL (ref 1.6–2.4)
MCH RBC QN AUTO: 32.1 PG (ref 26–35)
MCHC RBC AUTO-ENTMCNC: 34.1 G/DL (ref 32–34.5)
MCV RBC AUTO: 94.1 FL (ref 80–99.9)
MONOCYTES NFR BLD: 0.98 K/UL (ref 0.1–0.95)
MONOCYTES NFR BLD: 8 % (ref 2–12)
NEUTROPHILS NFR BLD: 51 % (ref 43–80)
NEUTS SEG NFR BLD: 6.08 K/UL (ref 1.8–7.3)
PARTIAL THROMBOPLASTIN TIME: 25.8 SEC (ref 24.5–35.1)
PLATELET # BLD AUTO: 401 K/UL (ref 130–450)
PMV BLD AUTO: 9.7 FL (ref 7–12)
POTASSIUM SERPL-SCNC: 3.4 MMOL/L (ref 3.5–5.1)
PROT SERPL-MCNC: 6.4 G/DL (ref 6.4–8.3)
PROTHROMBIN TIME: 12.5 SEC (ref 9.3–12.4)
RBC # BLD AUTO: 4.43 M/UL (ref 3.5–5.5)
SODIUM SERPL-SCNC: 139 MMOL/L (ref 136–145)
TROPONIN I SERPL HS-MCNC: 18 NG/L (ref 0–14)
TROPONIN I SERPL HS-MCNC: 22 NG/L (ref 0–14)
WBC OTHER # BLD: 11.9 K/UL (ref 4.5–11.5)

## 2025-08-27 PROCEDURE — 93005 ELECTROCARDIOGRAM TRACING: CPT

## 2025-08-27 PROCEDURE — 83735 ASSAY OF MAGNESIUM: CPT

## 2025-08-27 PROCEDURE — 71045 X-RAY EXAM CHEST 1 VIEW: CPT

## 2025-08-27 PROCEDURE — 80053 COMPREHEN METABOLIC PANEL: CPT

## 2025-08-27 PROCEDURE — 96375 TX/PRO/DX INJ NEW DRUG ADDON: CPT

## 2025-08-27 PROCEDURE — 99285 EMERGENCY DEPT VISIT HI MDM: CPT

## 2025-08-27 PROCEDURE — 84484 ASSAY OF TROPONIN QUANT: CPT

## 2025-08-27 PROCEDURE — 85610 PROTHROMBIN TIME: CPT

## 2025-08-27 PROCEDURE — 85730 THROMBOPLASTIN TIME PARTIAL: CPT

## 2025-08-27 PROCEDURE — 6360000002 HC RX W HCPCS: Performed by: STUDENT IN AN ORGANIZED HEALTH CARE EDUCATION/TRAINING PROGRAM

## 2025-08-27 PROCEDURE — 85025 COMPLETE CBC W/AUTO DIFF WBC: CPT

## 2025-08-27 PROCEDURE — 96365 THER/PROPH/DIAG IV INF INIT: CPT

## 2025-08-27 PROCEDURE — 6370000000 HC RX 637 (ALT 250 FOR IP)

## 2025-08-27 PROCEDURE — 6360000002 HC RX W HCPCS

## 2025-08-27 RX ORDER — ASPIRIN 81 MG/1
243 TABLET, CHEWABLE ORAL ONCE
Status: COMPLETED | OUTPATIENT
Start: 2025-08-27 | End: 2025-08-27

## 2025-08-27 RX ORDER — FENTANYL CITRATE 50 UG/ML
25 INJECTION, SOLUTION INTRAMUSCULAR; INTRAVENOUS ONCE
Refills: 0 | Status: COMPLETED | OUTPATIENT
Start: 2025-08-27 | End: 2025-08-27

## 2025-08-27 RX ORDER — HEPARIN SODIUM 1000 [USP'U]/ML
60 INJECTION, SOLUTION INTRAVENOUS; SUBCUTANEOUS ONCE
Status: COMPLETED | OUTPATIENT
Start: 2025-08-27 | End: 2025-08-27

## 2025-08-27 RX ORDER — HEPARIN SODIUM 10000 [USP'U]/100ML
5-30 INJECTION, SOLUTION INTRAVENOUS CONTINUOUS
Status: DISCONTINUED | OUTPATIENT
Start: 2025-08-27 | End: 2025-08-27 | Stop reason: HOSPADM

## 2025-08-27 RX ORDER — FENTANYL CITRATE 50 UG/ML
25 INJECTION, SOLUTION INTRAMUSCULAR; INTRAVENOUS ONCE
Refills: 0 | Status: DISCONTINUED | OUTPATIENT
Start: 2025-08-27 | End: 2025-08-27

## 2025-08-27 RX ORDER — HEPARIN SODIUM 1000 [USP'U]/ML
30 INJECTION, SOLUTION INTRAVENOUS; SUBCUTANEOUS PRN
Status: DISCONTINUED | OUTPATIENT
Start: 2025-08-27 | End: 2025-08-27 | Stop reason: HOSPADM

## 2025-08-27 RX ORDER — HEPARIN SODIUM 1000 [USP'U]/ML
60 INJECTION, SOLUTION INTRAVENOUS; SUBCUTANEOUS PRN
Status: DISCONTINUED | OUTPATIENT
Start: 2025-08-27 | End: 2025-08-27 | Stop reason: HOSPADM

## 2025-08-27 RX ADMIN — HEPARIN SODIUM AND DEXTROSE 12 UNITS/KG/HR: 10000; 5 INJECTION INTRAVENOUS at 13:41

## 2025-08-27 RX ADMIN — ASPIRIN 243 MG: 81 TABLET, CHEWABLE ORAL at 13:24

## 2025-08-27 RX ADMIN — FENTANYL CITRATE 25 MCG: 50 INJECTION INTRAMUSCULAR; INTRAVENOUS at 13:36

## 2025-08-27 RX ADMIN — HEPARIN SODIUM 3500 UNITS: 1000 INJECTION, SOLUTION INTRAVENOUS; SUBCUTANEOUS at 13:39

## 2025-08-28 LAB
EKG ATRIAL RATE: 58 BPM
EKG ATRIAL RATE: 62 BPM
EKG P AXIS: 45 DEGREES
EKG P AXIS: 46 DEGREES
EKG P-R INTERVAL: 210 MS
EKG P-R INTERVAL: 224 MS
EKG Q-T INTERVAL: 398 MS
EKG Q-T INTERVAL: 408 MS
EKG QRS DURATION: 88 MS
EKG QRS DURATION: 88 MS
EKG QTC CALCULATION (BAZETT): 400 MS
EKG QTC CALCULATION (BAZETT): 403 MS
EKG R AXIS: 24 DEGREES
EKG R AXIS: 30 DEGREES
EKG T AXIS: 102 DEGREES
EKG T AXIS: 97 DEGREES
EKG VENTRICULAR RATE: 58 BPM
EKG VENTRICULAR RATE: 62 BPM